# Patient Record
Sex: MALE | Race: WHITE | NOT HISPANIC OR LATINO
[De-identification: names, ages, dates, MRNs, and addresses within clinical notes are randomized per-mention and may not be internally consistent; named-entity substitution may affect disease eponyms.]

---

## 2023-04-27 PROBLEM — Z00.00 ENCOUNTER FOR PREVENTIVE HEALTH EXAMINATION: Status: ACTIVE | Noted: 2023-04-27

## 2023-05-05 ENCOUNTER — TRANSCRIPTION ENCOUNTER (OUTPATIENT)
Age: 58
End: 2023-05-05

## 2023-05-10 ENCOUNTER — NON-APPOINTMENT (OUTPATIENT)
Age: 58
End: 2023-05-10

## 2023-05-16 ENCOUNTER — APPOINTMENT (OUTPATIENT)
Dept: COLORECTAL SURGERY | Facility: CLINIC | Age: 58
End: 2023-05-16
Payer: COMMERCIAL

## 2023-05-16 VITALS
DIASTOLIC BLOOD PRESSURE: 77 MMHG | SYSTOLIC BLOOD PRESSURE: 121 MMHG | BODY MASS INDEX: 23.82 KG/M2 | HEIGHT: 66.5 IN | HEART RATE: 66 BPM | WEIGHT: 150 LBS

## 2023-05-16 PROCEDURE — 99024 POSTOP FOLLOW-UP VISIT: CPT

## 2023-05-16 NOTE — ASSESSMENT
[FreeTextEntry1] : 57 M here for post-op visit. he is doing well following his discharge. I reviewed and discussed the pathology results with him (pT3N0) cancer. We removed all skin staples today. He has a large left groin node.\par Plan:\par Will discuss his case at the colorectal tumor board.\par Further recommendations after that.\par Will keep a watch on the left groin node. If is is getting smaller, we will observe.\par See again after two weeks.\par Normal diet.\par Avoid exercise apart from walking.\par All questions answered.

## 2023-05-16 NOTE — HISTORY OF PRESENT ILLNESS
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  He didn't have it prior to the surgery but does feel like it's going down \par Appetite is good\par Feels like he is not eating as much but is snacking\par Pt tolerating a low fiber diet, had a crab cake with corn in it,\par Denies nausea or vomiting\par Has lost a lot of weight since a year ago. Pt weighed 185 lbs and is now 150 lbs- wants to know how he can recover the weight\par Moving his bowels regularly, stools are soft yellowish brown, they are better now as initially after surgery they were watery\par Denies pain, just has to move carefully, if he stretches he'll feel a discomfort

## 2023-05-16 NOTE — PHYSICAL EXAM
[Abdomen Masses] : No abdominal masses [Abdomen Tenderness] : ~T No ~M abdominal tenderness [No HSM] : no hepatosplenomegaly [Exam Deferred] : exam was deferred [No Rash or Lesion] : No rash or lesion [Alert] : alert [Oriented to Person] : oriented to person [Oriented to Place] : oriented to place [Oriented to Time] : oriented to time [Calm] : calm [de-identified] : Soft; midline incision clean and healing well; we removed all skin staples; left sided large, palpable groin node  [de-identified] : Normal [de-identified] : Normal [de-identified] : Normal [de-identified] : Normal [de-identified] : Normal

## 2023-05-18 ENCOUNTER — NON-APPOINTMENT (OUTPATIENT)
Age: 58
End: 2023-05-18

## 2023-05-22 ENCOUNTER — NON-APPOINTMENT (OUTPATIENT)
Age: 58
End: 2023-05-22

## 2023-05-23 ENCOUNTER — NON-APPOINTMENT (OUTPATIENT)
Age: 58
End: 2023-05-23

## 2023-05-24 ENCOUNTER — RESULT REVIEW (OUTPATIENT)
Age: 58
End: 2023-05-24

## 2023-05-24 ENCOUNTER — APPOINTMENT (OUTPATIENT)
Dept: HEMATOLOGY ONCOLOGY | Facility: CLINIC | Age: 58
End: 2023-05-24
Payer: COMMERCIAL

## 2023-05-24 VITALS
BODY MASS INDEX: 23.98 KG/M2 | RESPIRATION RATE: 18 BRPM | DIASTOLIC BLOOD PRESSURE: 68 MMHG | HEIGHT: 66.5 IN | WEIGHT: 151 LBS | SYSTOLIC BLOOD PRESSURE: 117 MMHG | OXYGEN SATURATION: 98 % | HEART RATE: 71 BPM | TEMPERATURE: 98.6 F

## 2023-05-24 DIAGNOSIS — Z78.9 OTHER SPECIFIED HEALTH STATUS: ICD-10-CM

## 2023-05-24 DIAGNOSIS — Z87.19 PERSONAL HISTORY OF OTHER DISEASES OF THE DIGESTIVE SYSTEM: ICD-10-CM

## 2023-05-24 DIAGNOSIS — L72.9 FOLLICULAR CYST OF THE SKIN AND SUBCUTANEOUS TISSUE, UNSPECIFIED: ICD-10-CM

## 2023-05-24 DIAGNOSIS — Z80.1 FAMILY HISTORY OF MALIGNANT NEOPLASM OF TRACHEA, BRONCHUS AND LUNG: ICD-10-CM

## 2023-05-24 PROCEDURE — 36415 COLL VENOUS BLD VENIPUNCTURE: CPT

## 2023-05-24 PROCEDURE — 99203 OFFICE O/P NEW LOW 30 MIN: CPT | Mod: 25

## 2023-05-24 RX ORDER — TRAMADOL HYDROCHLORIDE 25 MG/1
TABLET, COATED ORAL
Refills: 0 | Status: DISCONTINUED | COMMUNITY
End: 2023-05-24

## 2023-05-24 RX ORDER — IBUPROFEN 800 MG/1
TABLET, FILM COATED ORAL
Refills: 0 | Status: DISCONTINUED | COMMUNITY
End: 2023-05-24

## 2023-05-24 RX ORDER — ACETAMINOPHEN 325 MG/1
TABLET, FILM COATED ORAL
Refills: 0 | Status: DISCONTINUED | COMMUNITY
End: 2023-05-24

## 2023-05-28 NOTE — ASSESSMENT
[FreeTextEntry1] : Mr. 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 \par He presented to GI with history of unexplained weight loss of 35 pounds since last year and diarrhea x nine months. He underwent colonoscopy on 4/25/23 which showed afrond-like/villous and infiltrating obstructing large mass in the rectosigmoid colon with oozing present.\par  CT Chest/abdomen/pelvis done on 4/25/23 showed a mass lesion in the distal sigmoid colon with malignancy with large bowel obstruction.  There were mildly enlarged left lilac chain lymph nodes measuring up to 7 mm which were likely reactive. CT of the chest was negative. for malignancy. \par Preoperative CEA was normal but he was anemic.\par  He was referred for low anterior/sigmoid resection. \par Pathology from 5/2/23 revealed a moderately differentiated adenocarcinoma in the rectosigmoid with mucinous features invading through the muscularis propria into the pericolonic or perirectal tissue. 24 lymph nodes were negative for tumor. No. of tumor buds 5 per hotspot field, no LVI. No PNI The pathologic staging was pT3pN0. There was no loss of nuclear expression of MMR proteins with low probability of MSI-H. \par \par Plan:\par \par U/S of left inguinal adenopathy\par \par For final decision regarding adjuvant therapy after U/S\par

## 2023-05-28 NOTE — HISTORY OF PRESENT ILLNESS
[de-identified] : Mr. 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 \par He presented to GI with history of unexplained weight loss of 35 pounds since last year and diarrhea x nine months. He underwent colonoscopy on 4/25/23 which showed afrond-like/villous and infiltrating obstructing large mass in the rectosigmoid colon with oozing present.\par  CT Chest/abdomen/pelvis done on 4/25/23 showed a mass lesion in the distal sigmoid colon with malignancy with large bowel obstruction.  There were mildly enlarged left lilac chain lymph nodes measuring up to 7 mm which were likely reactive. CT of the chest was negative. for malignancy. Preoperative CEA was normal but he was anemic. Iron studies are not available. . \par  He was referred for low anterior/sigmoid resection. \par Pathology from 5/2/23 revealed a moderately differentiated adenocarcinoma in the rectosigmoid with mucinous features invading through the muscularis propria into the pericolonic or perirectal tissue. 24 lymph nodes were negative for tumor. The pathologic staging was pT3pN0. There was no loss of nuclear expression of MMR proteins with low probability of MSI-H. \par \par  He reports that his appetite is improving.  He states that sometimes the food "doesn't feel like it's going down"' He takes Pantoprazole for a hiatal hernia.  [de-identified] : Given obstruction on clinical presentation discussed adjuvant thherapy -- xeloda for  6months versus 5- FU/leucovorin\par Discussed left inguinal ultrasound

## 2023-05-28 NOTE — REVIEW OF SYSTEMS
[Diarrhea: Grade 0] : Diarrhea: Grade 0 [Negative] : Allergic/Immunologic [FreeTextEntry4] : Feels like he can't swallow his food sometimes-on Pantoprazole [FreeTextEntry7] : Thick Bm with some urgency after he takes Ensure [de-identified] : right neck cyst

## 2023-05-29 LAB
CEA SERPL-MCNC: 1.3 NG/ML
FERRITIN SERPL-MCNC: 23 NG/ML
FOLATE SERPL-MCNC: 11.5 NG/ML
IRON SATN MFR SERPL: 9 %
IRON SERPL-MCNC: 41 UG/DL
TIBC SERPL-MCNC: 436 UG/DL
UIBC SERPL-MCNC: 396 UG/DL
VIT B12 SERPL-MCNC: 779 PG/ML

## 2023-05-30 ENCOUNTER — APPOINTMENT (OUTPATIENT)
Dept: SURGERY | Facility: CLINIC | Age: 58
End: 2023-05-30

## 2023-05-30 ENCOUNTER — APPOINTMENT (OUTPATIENT)
Dept: COLORECTAL SURGERY | Facility: CLINIC | Age: 58
End: 2023-05-30
Payer: COMMERCIAL

## 2023-05-30 VITALS
DIASTOLIC BLOOD PRESSURE: 85 MMHG | TEMPERATURE: 97.8 F | WEIGHT: 151 LBS | HEIGHT: 65 IN | BODY MASS INDEX: 25.16 KG/M2 | HEART RATE: 63 BPM | SYSTOLIC BLOOD PRESSURE: 135 MMHG

## 2023-05-30 VITALS
BODY MASS INDEX: 25.16 KG/M2 | HEIGHT: 65 IN | HEART RATE: 67 BPM | SYSTOLIC BLOOD PRESSURE: 146 MMHG | WEIGHT: 151 LBS | DIASTOLIC BLOOD PRESSURE: 91 MMHG

## 2023-05-30 PROCEDURE — 99024 POSTOP FOLLOW-UP VISIT: CPT

## 2023-05-30 NOTE — ASSESSMENT
[FreeTextEntry1] : 57 M here for follow up. He is going to start adjuvant chemotherapy under Dr Coleman.\par On exam, the left groin swelling (lymph node) is smaller.\par Plan:\par See again after chemotherapy.\par To see Dr Reyez to rule out groin hernia.\par Adjuvant chemotherapy per Dr Coleman.\par All  questions answered.

## 2023-05-30 NOTE — HISTORY OF PRESENT ILLNESS
[FreeTextEntry1] : 57 year old male pt s/p on 5/2/23 open low anterior resection and sigmoid resection, intraoperative flexible sigmoidoscopy for obstructing recto-sigmoid tumor s/p colonoscopic stent placement\par \par Pathology:\par pT3N0\par \par Last seen May 16\par \par Saw Dr. Coleman on 5/24/23; advised adjuvant chemotherapy.\par \par Doing well; has some constipation; tolerating a normal diet.\par Reports the left groin swelling is smaller.

## 2023-05-30 NOTE — PHYSICAL EXAM
[Exam Deferred] : exam was deferred [No Rash or Lesion] : No rash or lesion [Alert] : alert [Oriented to Person] : oriented to person [Oriented to Place] : oriented to place [Oriented to Time] : oriented to time [Calm] : calm [de-identified] : Soft; well healed midline incision; left groin swelling (lymph node) is smaller and non tender [de-identified] : Normal [de-identified] : Normal [de-identified] : Normal [de-identified] : Normal [de-identified] : Normal

## 2023-06-01 ENCOUNTER — APPOINTMENT (OUTPATIENT)
Dept: HEMATOLOGY ONCOLOGY | Facility: CLINIC | Age: 58
End: 2023-06-01
Payer: COMMERCIAL

## 2023-06-01 LAB
DPYD GENOTYPE: NORMAL
DPYD PHENOTYPE: NORMAL
DPYD SPECIMEN: NORMAL

## 2023-06-01 PROCEDURE — 99443: CPT

## 2023-06-11 NOTE — ASSESSMENT
[FreeTextEntry1] : Mr. 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 \par He presented to GI with history of unexplained weight loss of 35 pounds since last year and diarrhea x nine months. He underwent colonoscopy on 4/25/23 which showed afrond-like/villous and infiltrating obstructing large mass in the rectosigmoid colon with oozing present.\par  CT Chest/abdomen/pelvis done on 4/25/23 showed a mass lesion in the distal sigmoid colon with malignancy with large bowel obstruction.  There were mildly enlarged left lilac chain lymph nodes measuring up to 7 mm which were likely reactive. CT of the chest was negative. for malignancy. \par Preoperative CEA was normal but he was anemic.\par  He was referred for low anterior/sigmoid resection. \par Pathology from 5/2/23 revealed a moderately differentiated adenocarcinoma in the rectosigmoid with mucinous features invading through the muscularis propria into the pericolonic or perirectal tissue. 24 lymph nodes were negative for tumor. No. of tumor buds 5 per hotspot field, no LVI. No PNI The pathologic staging was pT3pN0. There was no loss of nuclear expression of MMR proteins with low probability of MSI-H. \par \par Plan:\par \par Patient to consider his schedule and make a decision regarding port placement and  infusional 5- FU /Leucovorin x 6 months\par \par

## 2023-06-11 NOTE — HISTORY OF PRESENT ILLNESS
[de-identified] : Mr. 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 \par He presented to GI with history of unexplained weight loss of 35 pounds since last year and diarrhea x nine months. He underwent colonoscopy on 4/25/23 which showed afrond-like/villous and infiltrating obstructing large mass in the rectosigmoid colon with oozing present.\par  CT Chest/abdomen/pelvis done on 4/25/23 showed a mass lesion in the distal sigmoid colon with malignancy with large bowel obstruction.  There were mildly enlarged left lilac chain lymph nodes measuring up to 7 mm which were likely reactive. CT of the chest was negative. for malignancy. Preoperative CEA was normal but he was anemic. Iron studies are not available. . \par  He was referred for low anterior/sigmoid resection. \par Pathology from 5/2/23 revealed a moderately differentiated adenocarcinoma in the rectosigmoid with mucinous features invading through the muscularis propria into the pericolonic or perirectal tissue. 24 lymph nodes were negative for tumor. The pathologic staging was pT3pN0. There was no loss of nuclear expression of MMR proteins with low probability of MSI-H. \par \par  He reports that his appetite is improving.  He states that sometimes the food "doesn't feel like it's going down"' He takes Pantoprazole for a hiatal hernia.  [de-identified] : Verbal consent obtained for telephone visit\par \par Given obstruction on clinical presentation discussed adjuvant thherapy -- xeloda for  6months versus 5- FU/leucovorin\par Discussed left inguinal ultrasound and scrotal U/S -- benign adenopathy\par \par Discussed schedule, complications, supportive care with infusional 5- FU/Leucovorin, Bolus 5- FU/leucovorin and xEloda\par Recommneded infusional 5-FU/Leucovorin given side effect profile

## 2023-06-11 NOTE — REVIEW OF SYSTEMS
[Diarrhea: Grade 0] : Diarrhea: Grade 0 [Negative] : Allergic/Immunologic [FreeTextEntry4] : Feels like he can't swallow his food sometimes-on Pantoprazole [FreeTextEntry7] : Thick Bm with some urgency after he takes Ensure [de-identified] : right neck cyst

## 2023-06-12 ENCOUNTER — APPOINTMENT (OUTPATIENT)
Dept: HEMATOLOGY ONCOLOGY | Facility: CLINIC | Age: 58
End: 2023-06-12
Payer: COMMERCIAL

## 2023-06-12 VITALS
BODY MASS INDEX: 25.99 KG/M2 | WEIGHT: 156 LBS | HEART RATE: 63 BPM | RESPIRATION RATE: 18 BRPM | TEMPERATURE: 98.5 F | OXYGEN SATURATION: 97 % | DIASTOLIC BLOOD PRESSURE: 78 MMHG | SYSTOLIC BLOOD PRESSURE: 129 MMHG | HEIGHT: 65 IN

## 2023-06-12 PROCEDURE — 36415 COLL VENOUS BLD VENIPUNCTURE: CPT

## 2023-06-12 PROCEDURE — 99213 OFFICE O/P EST LOW 20 MIN: CPT | Mod: 25

## 2023-06-13 NOTE — ASSESSMENT
[FreeTextEntry1] : Mr. 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 \par He presented to GI with history of unexplained weight loss of 35 pounds since last year and diarrhea x nine months. He underwent colonoscopy on 4/25/23 which showed afrond-like/villous and infiltrating obstructing large mass in the rectosigmoid colon with oozing present.\par  CT Chest/abdomen/pelvis done on 4/25/23 showed a mass lesion in the distal sigmoid colon with malignancy with large bowel obstruction.  There were mildly enlarged left lilac chain lymph nodes measuring up to 7 mm which were likely reactive. CT of the chest was negative. for malignancy. \par Preoperative CEA was normal but he was anemic.\par  He was referred for low anterior/sigmoid resection. \par Pathology from 5/2/23 revealed a moderately differentiated adenocarcinoma in the rectosigmoid with mucinous features invading through the muscularis propria into the pericolonic or perirectal tissue. 24 lymph nodes were negative for tumor. No. of tumor buds 5 per hotspot field, no LVI. No PNI The pathologic staging was pT3pN0. There was no loss of nuclear expression of MMR proteins with low probability of MSI-H. \par \par \par Plan:\par to schedule port-a-cath\par To schedule FOLFIRI\par TO check CBC day before treatment\par \par \par \par \par \par

## 2023-06-13 NOTE — HISTORY OF PRESENT ILLNESS
[de-identified] : Mr. 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 \par He presented to GI with history of unexplained weight loss of 35 pounds since last year and diarrhea x nine months. He underwent colonoscopy on 4/25/23 which showed afrond-like/villous and infiltrating obstructing large mass in the rectosigmoid colon with oozing present.\par  CT Chest/abdomen/pelvis done on 4/25/23 showed a mass lesion in the distal sigmoid colon with malignancy with large bowel obstruction.  There were mildly enlarged left lilac chain lymph nodes measuring up to 7 mm which were likely reactive. CT of the chest was negative. for malignancy. Preoperative CEA was normal but he was anemic. Iron studies are not available. . \par  He was referred for low anterior/sigmoid resection. \par Pathology from 5/2/23 revealed a moderately differentiated adenocarcinoma in the rectosigmoid with mucinous features invading through the muscularis propria into the pericolonic or perirectal tissue. 24 lymph nodes were negative for tumor. The pathologic staging was pT3pN0. There was no loss of nuclear expression of MMR proteins with low probability of MSI-H. \par \par  He reports that his appetite is improving.  He states that sometimes the food "doesn't feel like it's going down"' He takes Pantoprazole for a hiatal hernia.  [de-identified] : Verbal consent obtained for telephone visit\par \par Given obstruction on clinical presentation discussed adjuvant thherapy -- xeloda for  6months versus 5- FU/leucovorin\par Discussed left inguinal ultrasound and scrotal U/S -- benign adenopathy\par \par Discussed schedule, complications, supportive care with infusional 5- FU/Leucovorin, Bolus 5- FU/leucovorin and xEloda\par Recommneded infusional 5-FU/Leucovorin given side effect profile

## 2023-06-13 NOTE — REVIEW OF SYSTEMS
[Diarrhea: Grade 0] : Diarrhea: Grade 0 [Negative] : Allergic/Immunologic [FreeTextEntry4] : Feels like he can't swallow his food sometimes-on Pantoprazole [FreeTextEntry7] : Thick Bm with some urgency after he takes Ensure [de-identified] : right neck cyst

## 2023-06-14 ENCOUNTER — RESULT REVIEW (OUTPATIENT)
Age: 58
End: 2023-06-14

## 2023-06-15 ENCOUNTER — NON-APPOINTMENT (OUTPATIENT)
Age: 58
End: 2023-06-15

## 2023-06-15 ENCOUNTER — RESULT REVIEW (OUTPATIENT)
Age: 58
End: 2023-06-15

## 2023-06-16 ENCOUNTER — RESULT REVIEW (OUTPATIENT)
Age: 58
End: 2023-06-16

## 2023-07-01 ENCOUNTER — LABORATORY RESULT (OUTPATIENT)
Age: 58
End: 2023-07-01

## 2023-07-03 ENCOUNTER — APPOINTMENT (OUTPATIENT)
Dept: HEMATOLOGY ONCOLOGY | Facility: CLINIC | Age: 58
End: 2023-07-03
Payer: COMMERCIAL

## 2023-07-03 VITALS
SYSTOLIC BLOOD PRESSURE: 122 MMHG | WEIGHT: 153 LBS | BODY MASS INDEX: 25.49 KG/M2 | HEART RATE: 71 BPM | TEMPERATURE: 97.6 F | HEIGHT: 65 IN | OXYGEN SATURATION: 98 % | DIASTOLIC BLOOD PRESSURE: 83 MMHG | RESPIRATION RATE: 18 BRPM

## 2023-07-03 VITALS — HEIGHT: 65.25 IN | BODY MASS INDEX: 25.19 KG/M2 | WEIGHT: 153 LBS

## 2023-07-03 LAB — APTT BLD: 42.9 SEC

## 2023-07-03 PROCEDURE — 36415 COLL VENOUS BLD VENIPUNCTURE: CPT

## 2023-07-03 PROCEDURE — 99213 OFFICE O/P EST LOW 20 MIN: CPT | Mod: 25

## 2023-07-03 NOTE — REVIEW OF SYSTEMS
[Diarrhea: Grade 0] : Diarrhea: Grade 0 [Negative] : Allergic/Immunologic [FreeTextEntry4] : Feels like he can't swallow his food sometimes-on Pantoprazole [FreeTextEntry7] : Thick Bm with some urgency after he takes Ensure [de-identified] : right neck cyst

## 2023-07-03 NOTE — PHYSICAL EXAM
[Fully active, able to carry on all pre-disease performance without restriction] : Status 0 - Fully active, able to carry on all pre-disease performance without restriction [Normal] : affect appropriate [de-identified] : sebaceous cyst in the nape of the neck

## 2023-07-03 NOTE — HISTORY OF PRESENT ILLNESS
[de-identified] : Mr. 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 \par He presented to GI with history of unexplained weight loss of 35 pounds since last year and diarrhea x nine months. He underwent colonoscopy on 4/25/23 which showed afrond-like/villous and infiltrating obstructing large mass in the rectosigmoid colon with oozing present.\par  CT Chest/abdomen/pelvis done on 4/25/23 showed a mass lesion in the distal sigmoid colon with malignancy with large bowel obstruction.  There were mildly enlarged left lilac chain lymph nodes measuring up to 7 mm which were likely reactive. CT of the chest was negative. for malignancy. Preoperative CEA was normal but he was anemic. Iron studies are not available. . \par  He was referred for low anterior/sigmoid resection. \par Pathology from 5/2/23 revealed a moderately differentiated adenocarcinoma in the rectosigmoid with mucinous features invading through the muscularis propria into the pericolonic or perirectal tissue. 24 lymph nodes were negative for tumor. The pathologic staging was pT3pN0. There was no loss of nuclear expression of MMR proteins with low probability of MSI-H. \par \par  He reports that his appetite is improving.  He states that sometimes the food "doesn't feel like it's going down"' He takes Pantoprazole for a hiatal hernia.  [de-identified] : \par \par circulating tumor DNA not detected 6/14/23\par \par Discussed that per [facility]CC[facility] guidelines , ct DNA results not sctionable at this time\par \par rediscussed side effrfects , schedule, supportive care

## 2023-07-03 NOTE — ASSESSMENT
[FreeTextEntry1] : Mr. 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 \par He presented to GI with history of unexplained weight loss of 35 pounds since last year and diarrhea x nine months. He underwent colonoscopy on 4/25/23 which showed afrond-like/villous and infiltrating obstructing large mass in the rectosigmoid colon with oozing present.\par  CT Chest/abdomen/pelvis done on 4/25/23 showed a mass lesion in the distal sigmoid colon with malignancy with large bowel obstruction.  There were mildly enlarged left lilac chain lymph nodes measuring up to 7 mm which were likely reactive. CT of the chest was negative. for malignancy. \par Preoperative CEA was normal but he was anemic.\par  He was referred for low anterior/sigmoid resection. \par Pathology from 5/2/23 revealed a moderately differentiated adenocarcinoma in the rectosigmoid with mucinous features invading through the muscularis propria into the pericolonic or perirectal tissue. 24 lymph nodes were negative for tumor. No. of tumor buds 5 per hotspot field, no LVI. No PNI The pathologic staging was pT3pN0. There was no loss of nuclear expression of MMR proteins with low probability of MSI-H. \par \par C1 infusional 5- FU 6/19/23\par \par \par Plan:\par \par For C2  Infusional 5- FU\par \par senna/colace prn\par \par RTC 2 weeks . check CBC/CMP/MG  on 7/15\par \par \par \par \par \par \par \par

## 2023-07-17 ENCOUNTER — APPOINTMENT (OUTPATIENT)
Dept: HEMATOLOGY ONCOLOGY | Facility: CLINIC | Age: 58
End: 2023-07-17
Payer: COMMERCIAL

## 2023-07-17 ENCOUNTER — RESULT REVIEW (OUTPATIENT)
Age: 58
End: 2023-07-17

## 2023-07-17 VITALS
DIASTOLIC BLOOD PRESSURE: 81 MMHG | RESPIRATION RATE: 18 BRPM | HEART RATE: 66 BPM | TEMPERATURE: 97.9 F | OXYGEN SATURATION: 98 % | HEIGHT: 65.25 IN | SYSTOLIC BLOOD PRESSURE: 134 MMHG | BODY MASS INDEX: 25.52 KG/M2 | WEIGHT: 155 LBS

## 2023-07-17 PROCEDURE — 99213 OFFICE O/P EST LOW 20 MIN: CPT

## 2023-07-23 NOTE — HISTORY OF PRESENT ILLNESS
[de-identified] : Mr. 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 \par He presented to GI with history of unexplained weight loss of 35 pounds since last year and diarrhea x nine months. He underwent colonoscopy on 4/25/23 which showed afrond-like/villous and infiltrating obstructing large mass in the rectosigmoid colon with oozing present.\par  CT Chest/abdomen/pelvis done on 4/25/23 showed a mass lesion in the distal sigmoid colon with malignancy with large bowel obstruction.  There were mildly enlarged left lilac chain lymph nodes measuring up to 7 mm which were likely reactive. CT of the chest was negative. for malignancy. Preoperative CEA was normal but he was anemic. Iron studies are not available. . \par  He was referred for low anterior/sigmoid resection. \par Pathology from 5/2/23 revealed a moderately differentiated adenocarcinoma in the rectosigmoid with mucinous features invading through the muscularis propria into the pericolonic or perirectal tissue. 24 lymph nodes were negative for tumor. The pathologic staging was pT3pN0. There was no loss of nuclear expression of MMR proteins with low probability of MSI-H. \par \par  He reports that his appetite is improving.  He states that sometimes the food "doesn't feel like it's going down"' He takes Pantoprazole for a hiatal hernia.  [de-identified] : Pt presents with rectosigmoid carcinoma receiving infusional 5FU. He is tolerating it well except her reports some discoloration (orange streaking) on his palms slight constipation last Friday through Monday.\par circulating tumor DNA not detected 6/14/23\par \par Discussed that per NCCN guidelines , ct DNA results not actionable at this time\par \par rediscussed side effrfects , schedule, supportive care

## 2023-07-23 NOTE — ASSESSMENT
[FreeTextEntry1] : Mr. 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 \par He presented to GI with history of unexplained weight loss of 35 pounds since last year and diarrhea x nine months. He underwent colonoscopy on 4/25/23 which showed afrond-like/villous and infiltrating obstructing large mass in the rectosigmoid colon with oozing present.\par  CT Chest/abdomen/pelvis done on 4/25/23 showed a mass lesion in the distal sigmoid colon with malignancy with large bowel obstruction.  There were mildly enlarged left lilac chain lymph nodes measuring up to 7 mm which were likely reactive. CT of the chest was negative. for malignancy. \par Preoperative CEA was normal but he was anemic.\par  He was referred for low anterior/sigmoid resection. \par Pathology from 5/2/23 revealed a moderately differentiated adenocarcinoma in the rectosigmoid with mucinous features invading through the muscularis propria into the pericolonic or perirectal tissue. 24 lymph nodes were negative for tumor. No. of tumor buds 5 per hotspot field, no LVI. No PNI The pathologic staging was pT3pN0. There was no loss of nuclear expression of MMR proteins with low probability of MSI-H. \par \par C1 infusional 5- FU 6/19/23\par \par \par Plan:\par - For C3  Infusional 5- FU- tolerating it well except for some orange streaking of palms. Discussed possible hand foot syndrome from 5 FU and using a good moisturizer\par - Constipation for a few days-discussed bowel regimen with Senna,Colace prn\par - Iron profile borderline- pt prefers to increase iron rich foods and take oral iron, discussed possibility of IV iron\par - Labs ordered, drawn in the office, reviewed and are adequate for treatment\par - Continue routine, age-appropriate, healthcare maintenance \par - Office visit in 2 weeks or prn for new or worsening symptoms. \par \par \par \par \par \par \par \par

## 2023-07-23 NOTE — PHYSICAL EXAM
MD at bedside and aware of BP, verbal order received for 500ml Saline bolus.    [Fully active, able to carry on all pre-disease performance without restriction] : Status 0 - Fully active, able to carry on all pre-disease performance without restriction [Normal] : affect appropriate [de-identified] : sebaceous cyst in the nape of the neck

## 2023-07-31 ENCOUNTER — RESULT REVIEW (OUTPATIENT)
Age: 58
End: 2023-07-31

## 2023-07-31 ENCOUNTER — APPOINTMENT (OUTPATIENT)
Dept: HEMATOLOGY ONCOLOGY | Facility: CLINIC | Age: 58
End: 2023-07-31
Payer: COMMERCIAL

## 2023-07-31 VITALS
SYSTOLIC BLOOD PRESSURE: 150 MMHG | WEIGHT: 154 LBS | OXYGEN SATURATION: 98 % | HEIGHT: 65.25 IN | BODY MASS INDEX: 25.35 KG/M2 | TEMPERATURE: 98.7 F | RESPIRATION RATE: 18 BRPM | HEART RATE: 63 BPM | DIASTOLIC BLOOD PRESSURE: 82 MMHG

## 2023-07-31 PROCEDURE — 36415 COLL VENOUS BLD VENIPUNCTURE: CPT

## 2023-07-31 PROCEDURE — 99213 OFFICE O/P EST LOW 20 MIN: CPT | Mod: 25

## 2023-08-06 NOTE — PHYSICAL EXAM
[Fully active, able to carry on all pre-disease performance without restriction] : Status 0 - Fully active, able to carry on all pre-disease performance without restriction [Normal] : affect appropriate [de-identified] : sebaceous cyst in the nape of the neck

## 2023-08-06 NOTE — ASSESSMENT
[FreeTextEntry1] : Mr. 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  He presented to GI with history of unexplained weight loss of 35 pounds since last year and diarrhea x nine months. He underwent colonoscopy on 4/25/23 which showed afrond-like/villous and infiltrating obstructing large mass in the rectosigmoid colon with oozing present.  CT Chest/abdomen/pelvis done on 4/25/23 showed a mass lesion in the distal sigmoid colon with malignancy with large bowel obstruction.  There were mildly enlarged left lilac chain lymph nodes measuring up to 7 mm which were likely reactive. CT of the chest was negative. for malignancy.  Preoperative CEA was normal but he was anemic.  He was referred for low anterior/sigmoid resection.  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 24 lymph nodes were negative for tumor. No. of tumor buds 5 per hotspot field, no LVI. No PNI The pathologic staging was pT3pN0. There was no loss of nuclear expression of MMR proteins with low probability of MSI-H.   C1 infusional 5- FU 6/19/23   Plan:  For C4  Infusional 5- FU  check cea / scans midtreatment  RTC 2 weeks . check CBC/CMP/MG  on 7/15

## 2023-08-06 NOTE — HISTORY OF PRESENT ILLNESS
[de-identified] : Mr. 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 \par  He presented to GI with history of unexplained weight loss of 35 pounds since last year and diarrhea x nine months. He underwent colonoscopy on 4/25/23 which showed afrond-like/villous and infiltrating obstructing large mass in the rectosigmoid colon with oozing present.\par   CT Chest/abdomen/pelvis done on 4/25/23 showed a mass lesion in the distal sigmoid colon with malignancy with large bowel obstruction.  There were mildly enlarged left lilac chain lymph nodes measuring up to 7 mm which were likely reactive. CT of the chest was negative. for malignancy. Preoperative CEA was normal but he was anemic. Iron studies are not available. . \par   He was referred for low anterior/sigmoid resection. 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 24 lymph nodes were negative for tumor. The pathologic staging was pT3pN0. There was no loss of nuclear expression of MMR proteins with low probability of MSI-H. \par  \par   He reports that his appetite is improving.  He states that sometimes the food "doesn't feel like it's going down"' He takes Pantoprazole for a hiatal hernia.  [de-identified] :  circulating tumor DNA not detected 6/14/23  Discussed that per [facility]CCN guidelines , ct DNA results not actionable at this time  rediscussed side effects , schedule, supportive care

## 2023-08-06 NOTE — REVIEW OF SYSTEMS
[Diarrhea: Grade 0] : Diarrhea: Grade 0 [Negative] : Allergic/Immunologic [FreeTextEntry4] : Feels like he can't swallow his food sometimes-on Pantoprazole [FreeTextEntry7] : Thick Bm with some urgency after he takes Ensure [de-identified] : right neck cyst

## 2023-08-10 DIAGNOSIS — R59.0 LOCALIZED ENLARGED LYMPH NODES: ICD-10-CM

## 2023-08-14 ENCOUNTER — RESULT REVIEW (OUTPATIENT)
Age: 58
End: 2023-08-14

## 2023-08-14 ENCOUNTER — APPOINTMENT (OUTPATIENT)
Dept: HEMATOLOGY ONCOLOGY | Facility: CLINIC | Age: 58
End: 2023-08-14
Payer: COMMERCIAL

## 2023-08-14 VITALS
HEIGHT: 65.25 IN | BODY MASS INDEX: 24.69 KG/M2 | TEMPERATURE: 98.3 F | SYSTOLIC BLOOD PRESSURE: 120 MMHG | HEART RATE: 64 BPM | WEIGHT: 150 LBS | DIASTOLIC BLOOD PRESSURE: 76 MMHG | OXYGEN SATURATION: 98 % | RESPIRATION RATE: 18 BRPM

## 2023-08-14 PROCEDURE — 99213 OFFICE O/P EST LOW 20 MIN: CPT

## 2023-08-14 NOTE — ASSESSMENT
[FreeTextEntry1] : Mr. 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  He presented to GI with history of unexplained weight loss of 35 pounds since last year and diarrhea x nine months. He underwent colonoscopy on 4/25/23 which showed afrond-like/villous and infiltrating obstructing large mass in the rectosigmoid colon with oozing present.  CT Chest/abdomen/pelvis done on 4/25/23 showed a mass lesion in the distal sigmoid colon with malignancy with large bowel obstruction.  There were mildly enlarged left lilac chain lymph nodes measuring up to 7 mm which were likely reactive. CT of the chest was negative. for malignancy.  Preoperative CEA was normal but he was anemic.  He was referred for low anterior/sigmoid resection.  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 24 lymph nodes were negative for tumor. No. of tumor buds 5 per hotspot field, no LVI. No PNI The pathologic staging was pT3pN0. There was no loss of nuclear expression of MMR proteins with low probability of MSI-H.   C1 infusional 5- FU 6/19/23   Plan: for cycle 5/12 For C4  Infusional 5- FU check cea / scans midtreatment Patient complaining of slight headache but states that he feels that his it is so minor he does not need to take any analgesics or have it evaluated. Sebaceous cyst-patient will be referred to Dr. Cha for evaluation. Labs ordered, drawn in the office, reviewed and are adequate for treatment Continue routine, age-appropriate, healthcare maintenance  History of present illness, review of systems, physical exam and treatment plan reviewed with Dr. Virginia Neff Office visit in 2 weeks or prn for new or worsening symptoms.

## 2023-08-14 NOTE — PHYSICAL EXAM
[Fully active, able to carry on all pre-disease performance without restriction] : Status 0 - Fully active, able to carry on all pre-disease performance without restriction [Normal] : affect appropriate [de-identified] : sebaceous cyst in the nape of the neck

## 2023-08-14 NOTE — REVIEW OF SYSTEMS
[Diarrhea: Grade 0] : Diarrhea: Grade 0 [Negative] : Gastrointestinal [de-identified] : right neck sebacous  cyst [de-identified] : Slight headache

## 2023-08-14 NOTE — HISTORY OF PRESENT ILLNESS
[de-identified] : Mr. 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 \par  He presented to GI with history of unexplained weight loss of 35 pounds since last year and diarrhea x nine months. He underwent colonoscopy on 4/25/23 which showed afrond-like/villous and infiltrating obstructing large mass in the rectosigmoid colon with oozing present.\par   CT Chest/abdomen/pelvis done on 4/25/23 showed a mass lesion in the distal sigmoid colon with malignancy with large bowel obstruction.  There were mildly enlarged left lilac chain lymph nodes measuring up to 7 mm which were likely reactive. CT of the chest was negative. for malignancy. Preoperative CEA was normal but he was anemic. Iron studies are not available. . \par   He was referred for low anterior/sigmoid resection. 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 24 lymph nodes were negative for tumor. The pathologic staging was pT3pN0. There was no loss of nuclear expression of MMR proteins with low probability of MSI-H. \par  \par   He reports that his appetite is improving.  He states that sometimes the food "doesn't feel like it's going down"' He takes Pantoprazole for a hiatal hernia.  [de-identified] : Pt presents for high risk Stage II (presented with bowel obstruction) rectosigmoid cancer receiving 5FU leuk cycle 5 He has slight headache after treatment. He would like to have surgery on a sebacioues cysts behind his neck at some point during school vacation.   circulating tumor DNA not detected 6/14/23  Discussed that per NCCN guidelines , ct DNA results not actionable at this time  rediscussed side effects , schedule, supportive care

## 2023-08-28 ENCOUNTER — RESULT REVIEW (OUTPATIENT)
Age: 58
End: 2023-08-28

## 2023-08-28 ENCOUNTER — APPOINTMENT (OUTPATIENT)
Dept: HEMATOLOGY ONCOLOGY | Facility: CLINIC | Age: 58
End: 2023-08-28
Payer: COMMERCIAL

## 2023-08-28 VITALS
RESPIRATION RATE: 18 BRPM | HEIGHT: 65.25 IN | OXYGEN SATURATION: 98 % | HEART RATE: 69 BPM | TEMPERATURE: 97.9 F | DIASTOLIC BLOOD PRESSURE: 71 MMHG | WEIGHT: 151 LBS | BODY MASS INDEX: 24.85 KG/M2 | SYSTOLIC BLOOD PRESSURE: 123 MMHG

## 2023-08-28 PROCEDURE — 99213 OFFICE O/P EST LOW 20 MIN: CPT

## 2023-08-28 NOTE — ASSESSMENT
[FreeTextEntry1] : Mr. 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  He presented to GI with history of unexplained weight loss of 35 pounds since last year and diarrhea x nine months. He underwent colonoscopy on 4/25/23 which showed afrond-like/villous and infiltrating obstructing large mass in the rectosigmoid colon with oozing present.  CT Chest/abdomen/pelvis done on 4/25/23 showed a mass lesion in the distal sigmoid colon with malignancy with large bowel obstruction.  There were mildly enlarged left lilac chain lymph nodes measuring up to 7 mm which were likely reactive. CT of the chest was negative. for malignancy.  Preoperative CEA was normal but he was anemic.  He was referred for low anterior/sigmoid resection.  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 24 lymph nodes were negative for tumor. No. of tumor buds 5 per hotspot field, no LVI. No PNI The pathologic staging was pT3pN0. There was no loss of nuclear expression of MMR proteins with low probability of MSI-H.   C1 infusional 5- FU 6/19/23   Plan: - for cycle 6/12 5FU/Leuk   - check cea / scans midtreatment - Labs ordered, drawn in the office, reviewed and are adequate for treatment. Iron levels pending. Continue routine, age-appropriate, healthcare maintenance  History of present illness, review of systems, physical exam and treatment plan reviewed with Dr. Virginia Neff Office visit in 2 weeks or prn for new or worsening symptoms.

## 2023-08-28 NOTE — PHYSICAL EXAM
[Fully active, able to carry on all pre-disease performance without restriction] : Status 0 - Fully active, able to carry on all pre-disease performance without restriction [Normal] : affect appropriate [de-identified] : sebaceous cyst in the nape of the neck

## 2023-08-28 NOTE — HISTORY OF PRESENT ILLNESS
[de-identified] : Mr. 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 \par  He presented to GI with history of unexplained weight loss of 35 pounds since last year and diarrhea x nine months. He underwent colonoscopy on 4/25/23 which showed afrond-like/villous and infiltrating obstructing large mass in the rectosigmoid colon with oozing present.\par   CT Chest/abdomen/pelvis done on 4/25/23 showed a mass lesion in the distal sigmoid colon with malignancy with large bowel obstruction.  There were mildly enlarged left lilac chain lymph nodes measuring up to 7 mm which were likely reactive. CT of the chest was negative. for malignancy. Preoperative CEA was normal but he was anemic. Iron studies are not available. . \par   He was referred for low anterior/sigmoid resection. 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 24 lymph nodes were negative for tumor. The pathologic staging was pT3pN0. There was no loss of nuclear expression of MMR proteins with low probability of MSI-H. \par  \par   He reports that his appetite is improving.  He states that sometimes the food "doesn't feel like it's going down"' He takes Pantoprazole for a hiatal hernia.  [de-identified] : Pt presents for high risk Stage II (presented with bowel obstruction) rectosigmoid cancer receiving 5FU leuk eow cycle 6 He is tolerating treatment well without any side effects.    His irons were previously low, he is atking oral iron for one month.   We will recheck.    circulating tumor DNA not detected 6/14/23  Discussed that per NCCN guidelines , ct DNA results not actionable at this time  rediscussed side effects , schedule, supportive care

## 2023-08-28 NOTE — REVIEW OF SYSTEMS
[Diarrhea: Grade 0] : Diarrhea: Grade 0 [Negative] : Allergic/Immunologic [de-identified] : right neck sebacous  cyst [de-identified] : Slight headache

## 2023-08-29 ENCOUNTER — APPOINTMENT (OUTPATIENT)
Dept: HEMATOLOGY ONCOLOGY | Facility: CLINIC | Age: 58
End: 2023-08-29

## 2023-08-29 ENCOUNTER — NON-APPOINTMENT (OUTPATIENT)
Age: 58
End: 2023-08-29

## 2023-09-02 LAB
FERRITIN SERPL-MCNC: 44 NG/ML
IRON SATN MFR SERPL: 16 %
IRON SERPL-MCNC: 68 UG/DL
TIBC SERPL-MCNC: 439 UG/DL
UIBC SERPL-MCNC: 370 UG/DL

## 2023-09-08 ENCOUNTER — RESULT REVIEW (OUTPATIENT)
Age: 58
End: 2023-09-08

## 2023-09-11 ENCOUNTER — APPOINTMENT (OUTPATIENT)
Dept: HEMATOLOGY ONCOLOGY | Facility: CLINIC | Age: 58
End: 2023-09-11

## 2023-09-14 ENCOUNTER — TRANSCRIPTION ENCOUNTER (OUTPATIENT)
Age: 58
End: 2023-09-14

## 2023-09-21 ENCOUNTER — APPOINTMENT (OUTPATIENT)
Dept: SURGERY | Facility: CLINIC | Age: 58
End: 2023-09-21
Payer: COMMERCIAL

## 2023-09-21 PROCEDURE — 99024 POSTOP FOLLOW-UP VISIT: CPT

## 2023-09-27 ENCOUNTER — NON-APPOINTMENT (OUTPATIENT)
Age: 58
End: 2023-09-27

## 2023-10-12 ENCOUNTER — APPOINTMENT (OUTPATIENT)
Dept: SURGERY | Facility: CLINIC | Age: 58
End: 2023-10-12
Payer: COMMERCIAL

## 2023-10-12 VITALS — SYSTOLIC BLOOD PRESSURE: 126 MMHG | HEART RATE: 72 BPM | DIASTOLIC BLOOD PRESSURE: 80 MMHG | TEMPERATURE: 98.3 F

## 2023-10-12 PROCEDURE — 99024 POSTOP FOLLOW-UP VISIT: CPT

## 2023-10-16 ENCOUNTER — APPOINTMENT (OUTPATIENT)
Dept: HEMATOLOGY ONCOLOGY | Facility: CLINIC | Age: 58
End: 2023-10-16

## 2023-10-23 ENCOUNTER — APPOINTMENT (OUTPATIENT)
Dept: HEMATOLOGY ONCOLOGY | Facility: CLINIC | Age: 58
End: 2023-10-23
Payer: COMMERCIAL

## 2023-10-23 ENCOUNTER — RESULT REVIEW (OUTPATIENT)
Age: 58
End: 2023-10-23

## 2023-10-23 VITALS
DIASTOLIC BLOOD PRESSURE: 87 MMHG | HEART RATE: 63 BPM | WEIGHT: 148 LBS | HEIGHT: 65.25 IN | SYSTOLIC BLOOD PRESSURE: 144 MMHG | TEMPERATURE: 98.6 F | OXYGEN SATURATION: 99 % | BODY MASS INDEX: 24.36 KG/M2 | RESPIRATION RATE: 18 BRPM

## 2023-10-23 PROCEDURE — 99213 OFFICE O/P EST LOW 20 MIN: CPT

## 2023-11-06 ENCOUNTER — RESULT REVIEW (OUTPATIENT)
Age: 58
End: 2023-11-06

## 2023-11-06 ENCOUNTER — APPOINTMENT (OUTPATIENT)
Dept: HEMATOLOGY ONCOLOGY | Facility: CLINIC | Age: 58
End: 2023-11-06
Payer: COMMERCIAL

## 2023-11-06 VITALS
DIASTOLIC BLOOD PRESSURE: 72 MMHG | BODY MASS INDEX: 24.03 KG/M2 | OXYGEN SATURATION: 98 % | SYSTOLIC BLOOD PRESSURE: 118 MMHG | WEIGHT: 146 LBS | RESPIRATION RATE: 18 BRPM | HEIGHT: 65.25 IN | HEART RATE: 72 BPM | TEMPERATURE: 97.8 F

## 2023-11-06 PROCEDURE — 36415 COLL VENOUS BLD VENIPUNCTURE: CPT

## 2023-11-06 PROCEDURE — 99213 OFFICE O/P EST LOW 20 MIN: CPT | Mod: 25

## 2023-11-12 LAB
CEA SERPL-MCNC: 1.2 NG/ML
FERRITIN SERPL-MCNC: 18 NG/ML
IRON SATN MFR SERPL: 9 %
IRON SERPL-MCNC: 39 UG/DL
TIBC SERPL-MCNC: 434 UG/DL
UIBC SERPL-MCNC: 394 UG/DL

## 2023-11-20 ENCOUNTER — APPOINTMENT (OUTPATIENT)
Dept: HEMATOLOGY ONCOLOGY | Facility: CLINIC | Age: 58
End: 2023-11-20
Payer: COMMERCIAL

## 2023-11-20 ENCOUNTER — RESULT REVIEW (OUTPATIENT)
Age: 58
End: 2023-11-20

## 2023-11-20 VITALS
HEIGHT: 65.25 IN | DIASTOLIC BLOOD PRESSURE: 75 MMHG | TEMPERATURE: 98 F | WEIGHT: 152 LBS | SYSTOLIC BLOOD PRESSURE: 114 MMHG | RESPIRATION RATE: 16 BRPM | OXYGEN SATURATION: 99 % | BODY MASS INDEX: 25.02 KG/M2 | HEART RATE: 64 BPM

## 2023-11-20 PROCEDURE — 36415 COLL VENOUS BLD VENIPUNCTURE: CPT

## 2023-11-20 PROCEDURE — 99213 OFFICE O/P EST LOW 20 MIN: CPT | Mod: 25

## 2023-12-04 ENCOUNTER — RESULT REVIEW (OUTPATIENT)
Age: 58
End: 2023-12-04

## 2023-12-04 ENCOUNTER — APPOINTMENT (OUTPATIENT)
Dept: HEMATOLOGY ONCOLOGY | Facility: CLINIC | Age: 58
End: 2023-12-04
Payer: COMMERCIAL

## 2023-12-04 VITALS
WEIGHT: 153 LBS | OXYGEN SATURATION: 98 % | SYSTOLIC BLOOD PRESSURE: 115 MMHG | HEART RATE: 66 BPM | DIASTOLIC BLOOD PRESSURE: 73 MMHG | BODY MASS INDEX: 25.26 KG/M2 | TEMPERATURE: 98.4 F | RESPIRATION RATE: 16 BRPM

## 2023-12-04 PROCEDURE — 99213 OFFICE O/P EST LOW 20 MIN: CPT | Mod: 25

## 2023-12-04 PROCEDURE — 36415 COLL VENOUS BLD VENIPUNCTURE: CPT

## 2023-12-04 RX ORDER — PANTOPRAZOLE SODIUM 20 MG/1
TABLET, DELAYED RELEASE ORAL
Refills: 0 | Status: DISCONTINUED | COMMUNITY
End: 2023-12-04

## 2023-12-07 LAB
FERRITIN SERPL-MCNC: 17 NG/ML
IRON SATN MFR SERPL: 12 %
IRON SERPL-MCNC: 51 UG/DL
TIBC SERPL-MCNC: 423 UG/DL
UIBC SERPL-MCNC: 373 UG/DL

## 2023-12-18 ENCOUNTER — NON-APPOINTMENT (OUTPATIENT)
Age: 58
End: 2023-12-18

## 2023-12-18 ENCOUNTER — APPOINTMENT (OUTPATIENT)
Dept: HEMATOLOGY ONCOLOGY | Facility: CLINIC | Age: 58
End: 2023-12-18

## 2023-12-18 DIAGNOSIS — U07.1 COVID-19: ICD-10-CM

## 2024-01-02 ENCOUNTER — RESULT REVIEW (OUTPATIENT)
Age: 59
End: 2024-01-02

## 2024-01-02 ENCOUNTER — APPOINTMENT (OUTPATIENT)
Dept: HEMATOLOGY ONCOLOGY | Facility: CLINIC | Age: 59
End: 2024-01-02
Payer: COMMERCIAL

## 2024-01-02 VITALS
DIASTOLIC BLOOD PRESSURE: 73 MMHG | BODY MASS INDEX: 25.55 KG/M2 | HEIGHT: 65.25 IN | WEIGHT: 155.2 LBS | RESPIRATION RATE: 16 BRPM | OXYGEN SATURATION: 97 % | TEMPERATURE: 98.1 F | SYSTOLIC BLOOD PRESSURE: 114 MMHG | HEART RATE: 66 BPM

## 2024-01-02 PROCEDURE — 99213 OFFICE O/P EST LOW 20 MIN: CPT | Mod: 25

## 2024-01-02 PROCEDURE — 36415 COLL VENOUS BLD VENIPUNCTURE: CPT

## 2024-01-02 RX ORDER — MOLNUPIRAVIR 200 MG/1
200 CAPSULE ORAL
Qty: 40 | Refills: 0 | Status: DISCONTINUED | COMMUNITY
Start: 2023-12-18 | End: 2024-01-02

## 2024-01-02 NOTE — HISTORY OF PRESENT ILLNESS
[de-identified] : Mr. 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 \par  He presented to GI with history of unexplained weight loss of 35 pounds since last year and diarrhea x nine months. He underwent colonoscopy on 4/25/23 which showed afrond-like/villous and infiltrating obstructing large mass in the rectosigmoid colon with oozing present.\par   CT Chest/abdomen/pelvis done on 4/25/23 showed a mass lesion in the distal sigmoid colon with malignancy with large bowel obstruction.  There were mildly enlarged left lilac chain lymph nodes measuring up to 7 mm which were likely reactive. CT of the chest was negative. for malignancy. Preoperative CEA was normal but he was anemic. Iron studies are not available. . \par   He was referred for low anterior/sigmoid resection. 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 24 lymph nodes were negative for tumor. The pathologic staging was pT3pN0. There was no loss of nuclear expression of MMR proteins with low probability of MSI-H. \par  \par   He reports that his appetite is improving.  He states that sometimes the food "doesn't feel like it's going down"' He takes Pantoprazole for a hiatal hernia.  [de-identified] : Pt presents for high risk Stage II (presented with bowel obstruction) rectosigmoid cancer receiving 5FU leuk eow cycle 6.  Started 6/19/2023 last dose before hospitalization was 8/28/2023 [facility] chemo was recently held for admission for incarcerated inguinal hernia causing small bowel obstruction, with ischemic small bowel, status postsurgical repair.  He will be resuming today. circulating tumor DNA not detected 6/14/23 Discussed that per NCCN guidelines , ct DNA results not actionable at this time ________________________________________________________________________________  Tested COVID + 12/17/23. Had runny nose, fatigue, chills, congestion. No fever. Mild cough no abdominal pain. No diarrhea. Symptoms lasted 4 says

## 2024-01-02 NOTE — REVIEW OF SYSTEMS
[Diarrhea: Grade 0] : Diarrhea: Grade 0 [Negative] : Allergic/Immunologic [de-identified] : right neck sebacous  cyst

## 2024-01-02 NOTE — ASSESSMENT
[FreeTextEntry1] : Mr. 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 He presented to GI with history of unexplained weight loss of 35 pounds since last year and diarrhea x nine months. He underwent colonoscopy on 4/25/23 which showed afrond-like/villous and infiltrating obstructing large mass in the rectosigmoid colon with oozing present.  CT Chest/abdomen/pelvis done on 4/25/23 showed a mass lesion in the distal sigmoid colon with malignancy with large bowel obstruction. There were mildly enlarged left lilac chain lymph nodes measuring up to 7 mm which were likely reactive. CT of the chest was negative. for malignancy. Preoperative CEA was normal but he was anemic.  He was referred for low anterior/sigmoid resection. 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 24 lymph nodes were negative for tumor. No. of tumor buds 5 per hotspot field, no LVI. No PNI The pathologic staging was pT3pN0. There was no loss of nuclear expression of MMR proteins with low probability of MSI-H.  C1 infusional 5- FU 6/19/23  CEA --normal 10/3/23   Plan:  - 5- FU/Leucovorin C11  today - venofer 200mg IVSS Q 2 weeks x 5 doses ---deferring it till after chemo -colonscopy -- 2/20/24 CT scans Q 6 months x 2 yrs. then annually Monitor  CEA   RTC 2 weeks

## 2024-01-02 NOTE — PHYSICAL EXAM
[Restricted in physically strenuous activity but ambulatory and able to carry out work of a light or sedentary nature] : Status 1- Restricted in physically strenuous activity but ambulatory and able to carry out work of a light or sedentary nature, e.g., light house work, office work [Normal] : affect appropriate [de-identified] : sebaceous cyst in the nape of the neck

## 2024-01-11 ENCOUNTER — APPOINTMENT (OUTPATIENT)
Dept: SURGERY | Facility: CLINIC | Age: 59
End: 2024-01-11
Payer: COMMERCIAL

## 2024-01-11 VITALS
SYSTOLIC BLOOD PRESSURE: 113 MMHG | WEIGHT: 151 LBS | HEART RATE: 62 BPM | DIASTOLIC BLOOD PRESSURE: 77 MMHG | HEIGHT: 65.25 IN | BODY MASS INDEX: 24.85 KG/M2

## 2024-01-11 DIAGNOSIS — Z87.19 OTHER SPECIFIED POSTPROCEDURAL STATES: ICD-10-CM

## 2024-01-11 DIAGNOSIS — Z98.890 OTHER SPECIFIED POSTPROCEDURAL STATES: ICD-10-CM

## 2024-01-11 DIAGNOSIS — Z90.49 ACQUIRED ABSENCE OF OTHER SPECIFIED PARTS OF DIGESTIVE TRACT: ICD-10-CM

## 2024-01-11 PROCEDURE — 99214 OFFICE O/P EST MOD 30 MIN: CPT

## 2024-01-16 ENCOUNTER — RESULT REVIEW (OUTPATIENT)
Age: 59
End: 2024-01-16

## 2024-01-16 ENCOUNTER — APPOINTMENT (OUTPATIENT)
Dept: HEMATOLOGY ONCOLOGY | Facility: CLINIC | Age: 59
End: 2024-01-16
Payer: COMMERCIAL

## 2024-01-16 VITALS
WEIGHT: 153 LBS | HEIGHT: 65.25 IN | OXYGEN SATURATION: 96 % | DIASTOLIC BLOOD PRESSURE: 68 MMHG | TEMPERATURE: 98.5 F | HEART RATE: 84 BPM | SYSTOLIC BLOOD PRESSURE: 119 MMHG | RESPIRATION RATE: 16 BRPM | BODY MASS INDEX: 25.19 KG/M2

## 2024-01-16 PROCEDURE — 99214 OFFICE O/P EST MOD 30 MIN: CPT

## 2024-01-16 RX ORDER — ONDANSETRON 8 MG/1
8 TABLET ORAL
Qty: 30 | Refills: 2 | Status: DISCONTINUED | COMMUNITY
Start: 2023-06-20 | End: 2024-01-16

## 2024-01-20 NOTE — HISTORY OF PRESENT ILLNESS
[de-identified] : Mr. Dempsey is a 58 year old male who is referred for initial consultation for rectosigmoid carcinoma.  He presented to GI with history of unexplained weight loss of 35 pounds since last year and diarrhea x nine months. He underwent colonoscopy on 4/25/23 which showed afrond-like/villous and infiltrating obstructing large mass in the rectosigmoid colon with oozing present.  CT Chest/abdomen/pelvis done on 4/25/23 showed a mass lesion in the distal sigmoid colon with malignancy with large bowel obstruction.  There were mildly enlarged left lilac chain lymph nodes measuring up to 7 mm which were likely reactive. CT of the chest was negative. for malignancy. Preoperative CEA was normal but he was anemic. Iron studies are not available. .   He was referred for low anterior/sigmoid resection.  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 24 lymph nodes were negative for tumor. The pathologic staging was pT3pN0. There was no loss of nuclear expression of MMR proteins with low probability of MSI-H.    He reports that his appetite is improving.  He states that sometimes the food "doesn't feel like it's going down"' He takes Pantoprazole for a hiatal hernia.  [de-identified] : Pt presents for high risk Stage II (presented with bowel obstruction) rectosigmoid cancer receiving 5FU leuk eow cycle 6.  Started 6/19/2023 last dose before hospitalization was 8/28/2023 [facility] chemo was held for admission for incarcerated inguinal hernia causing small bowel obstruction, with ischemic small bowel, status postsurgical repair.  Therefore completion was delayed.  His last cycle is today.  He underwent colonoscopy 4/23. He was found to be iron deficient in late dec but has been deferring treatment until after chemo was completed.  circulating tumor DNA not detected 6/14/23 Discussed that per NCCN guidelines , ct DNA results not actionable at this time ________________________________________________________________________________  Tested COVID + 12/17/23. Had runny nose, fatigue, chills, congestion. No fever. Mild cough no abdominal pain. No diarrhea. Symptoms lasted 4 says

## 2024-01-20 NOTE — REVIEW OF SYSTEMS
[Diarrhea: Grade 0] : Diarrhea: Grade 0 [Negative] : Allergic/Immunologic [de-identified] : right neck sebacous  cyst

## 2024-01-20 NOTE — PHYSICAL EXAM
[Restricted in physically strenuous activity but ambulatory and able to carry out work of a light or sedentary nature] : Status 1- Restricted in physically strenuous activity but ambulatory and able to carry out work of a light or sedentary nature, e.g., light house work, office work [Normal] : affect appropriate [de-identified] : sebaceous cyst in the nape of the neck

## 2024-01-20 NOTE — ASSESSMENT
[FreeTextEntry1] : Mr. 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 He presented to GI with history of unexplained weight loss of 35 pounds since last year and diarrhea x nine months. He underwent colonoscopy on 4/25/23 which showed afrond-like/villous and infiltrating obstructing large mass in the rectosigmoid colon with oozing present.  CT Chest/abdomen/pelvis done on 4/25/23 showed a mass lesion in the distal sigmoid colon with malignancy with large bowel obstruction. There were mildly enlarged left lilac chain lymph nodes measuring up to 7 mm which were likely reactive. CT of the chest was negative. for malignancy. Preoperative CEA was normal but he was anemic.  He was referred for low anterior/sigmoid resection. 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 24 lymph nodes were negative for tumor. No. of tumor buds 5 per hotspot field, no LVI. No PNI The pathologic staging was pT3pN0. There was no loss of nuclear expression of MMR proteins with low probability of MSI-H.  C1 infusional 5- FU 6/19/23  CEA --normal 10/3/23   Plan: - 5- FU/Leucovorin completed today Labs ordered, drawn in the office, reviewed and are adequate for treatment  - Venofer 200mg IVSS Q 2 weeks x 5 doses ---deferring it till after chemo. Pt trying to fit infusions into work schedule.  -colonscopy -- 2/20/24 - CT scans Q 6 months x 2 yrs. then annually - Monitor  CEA  - Continue routine, age-appropriate, healthcare maintenance  - History of present illness, review of systems, physical exam and treatment plan reviewed with Dr. Lantigua   - Office visit in 2 weeks or prn for new or worsening symptoms.

## 2024-01-29 ENCOUNTER — RESULT REVIEW (OUTPATIENT)
Age: 59
End: 2024-01-29

## 2024-01-29 ENCOUNTER — APPOINTMENT (OUTPATIENT)
Dept: HEMATOLOGY ONCOLOGY | Facility: CLINIC | Age: 59
End: 2024-01-29
Payer: COMMERCIAL

## 2024-01-29 VITALS
HEART RATE: 72 BPM | HEIGHT: 65.25 IN | RESPIRATION RATE: 16 BRPM | BODY MASS INDEX: 25.19 KG/M2 | DIASTOLIC BLOOD PRESSURE: 76 MMHG | WEIGHT: 153 LBS | TEMPERATURE: 97.5 F | OXYGEN SATURATION: 99 % | SYSTOLIC BLOOD PRESSURE: 118 MMHG

## 2024-01-29 PROBLEM — Z98.890 STATUS POST INGUINAL HERNIA REPAIR USING SYNTHETIC PATCH: Status: ACTIVE | Noted: 2023-09-21

## 2024-01-29 PROBLEM — Z90.49 STATUS POST SMALL BOWEL RESECTION: Status: ACTIVE | Noted: 2023-09-21

## 2024-01-29 PROCEDURE — 99214 OFFICE O/P EST MOD 30 MIN: CPT

## 2024-01-29 RX ORDER — FERROUS SULFATE 137(45) MG
142 (45 FE) TABLET, EXTENDED RELEASE ORAL
Refills: 0 | Status: ACTIVE | COMMUNITY

## 2024-01-29 NOTE — PHYSICAL EXAM
[Restricted in physically strenuous activity but ambulatory and able to carry out work of a light or sedentary nature] : Status 1- Restricted in physically strenuous activity but ambulatory and able to carry out work of a light or sedentary nature, e.g., light house work, office work [Normal] : affect appropriate [de-identified] : sebaceous cyst in the nape of the neck [de-identified] : port in place

## 2024-01-29 NOTE — PHYSICAL EXAM
[Normal Breath Sounds] : Normal breath sounds [Normal Heart Sounds] : normal heart sounds [de-identified] : AAOX5 [de-identified] : WNL [de-identified] : CISCOL [de-identified] :   Citizen of [place]  VENTRAL HRNIA WITH MULTIPLE DEFECTS ALL REDUCIBLE

## 2024-01-29 NOTE — REVIEW OF SYSTEMS
[Diarrhea: Grade 0] : Diarrhea: Grade 0 [Negative] : Allergic/Immunologic [de-identified] : right neck sebacous  cyst

## 2024-01-29 NOTE — ASSESSMENT
[FreeTextEntry1] : Mr. 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   Now under surveillance  Plan: >CBC, CMP, CEA, and iron studies (ferritin, TIBC, iron) every 3 months  >CT scans Q 6 months x 2 yrs. then annually for up to 5 years (ordered this visit to be done now) >Colonoscopy -- 2/20/24 > Continue po iron every other day with Vitamin C to avoid constipation > Port flushes every 8 weeks > Continue routine, age-appropriate, healthcare maintenance  Return to clinic in 3 months

## 2024-01-29 NOTE — HISTORY OF PRESENT ILLNESS
[ECOG Performance Status: 1 - Restricted in physically strenuous activity but ambulatory and able to carry out work of a light or sedentary nature] : Performance Status: 1 - Restricted in physically strenuous activity but ambulatory and able to carry out work of a light or sedentary nature, e.g., light house work, office work [de-identified] : Mr. 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   ONC HX: >4/25/2023: 35 pound weight loss and diarrhea and anemia. > Colonoscopy: a frond-like/villous and infiltrating obstructing large mass in the rectosigmoid colon with oozing present. > CT C/A/P showed a mass lesion in the distal sigmoid colon with malignancy with large bowel obstruction.  There were mildly enlarged left lilac chain lymph nodes measuring up to 7 mm which were likely reactive. CT of the chest was negative. for malignancy.  >Preoperative CEA was normal 10/2023 >s/p low anterior/sigmoid resection  PATHOLOGY: 5/2/23  >Moderately differentiated adenocarcinoma in the rectosigmoid with mucinous features invading through the muscularis propria into the pericolonic or perirectal tissue.  >24 lymph nodes were negative for tumor.  > pT3pN0. OMER >Circulating tumor DNA not detected 6/14/23- Discussed that per NCCN guidelines, CT DNA results not actionable at this time  TREATMENT HX: Adjuvant infusional 5- FU/Leucovorin X 12 cycles (6/2023- 1/2024) Delayed due to hospitalization for incarcerated inguinal hernia  [de-identified] : Patient feels very well. Denies any complaints. Has been taking po iron for the past 3 weeks. Would like to try oral iron instead of IV iron at this time. Due for repeat Colonoscopy on 2/20/24 Patient states that he may have to undergo a repeat surgery for his inguinal hernia at some point

## 2024-01-30 NOTE — REVIEW OF SYSTEMS
[Diarrhea: Grade 0] : Diarrhea: Grade 0 [Constipation] : constipation [Negative] : ENT no [de-identified] : some orange streaking of his palms with 5 FU, right neck cyst

## 2024-01-31 LAB — CEA SERPL-MCNC: 1.1 NG/ML

## 2024-02-07 ENCOUNTER — NON-APPOINTMENT (OUTPATIENT)
Age: 59
End: 2024-02-07

## 2024-03-12 ENCOUNTER — APPOINTMENT (OUTPATIENT)
Dept: HEMATOLOGY ONCOLOGY | Facility: CLINIC | Age: 59
End: 2024-03-12

## 2024-03-19 ENCOUNTER — NON-APPOINTMENT (OUTPATIENT)
Age: 59
End: 2024-03-19

## 2024-03-19 ENCOUNTER — APPOINTMENT (OUTPATIENT)
Dept: HEMATOLOGY ONCOLOGY | Facility: CLINIC | Age: 59
End: 2024-03-19

## 2024-03-19 ENCOUNTER — RESULT REVIEW (OUTPATIENT)
Age: 59
End: 2024-03-19

## 2024-03-19 VITALS
TEMPERATURE: 97.7 F | OXYGEN SATURATION: 98 % | DIASTOLIC BLOOD PRESSURE: 70 MMHG | HEIGHT: 62 IN | BODY MASS INDEX: 28.16 KG/M2 | WEIGHT: 153 LBS | SYSTOLIC BLOOD PRESSURE: 109 MMHG | RESPIRATION RATE: 18 BRPM | HEART RATE: 59 BPM

## 2024-04-01 ENCOUNTER — NON-APPOINTMENT (OUTPATIENT)
Age: 59
End: 2024-04-01

## 2024-04-15 ENCOUNTER — RESULT REVIEW (OUTPATIENT)
Age: 59
End: 2024-04-15

## 2024-04-22 ENCOUNTER — RESULT REVIEW (OUTPATIENT)
Age: 59
End: 2024-04-22

## 2024-04-22 ENCOUNTER — LABORATORY RESULT (OUTPATIENT)
Age: 59
End: 2024-04-22

## 2024-04-22 ENCOUNTER — APPOINTMENT (OUTPATIENT)
Dept: HEMATOLOGY ONCOLOGY | Facility: CLINIC | Age: 59
End: 2024-04-22
Payer: COMMERCIAL

## 2024-04-22 VITALS
TEMPERATURE: 97.6 F | WEIGHT: 154 LBS | BODY MASS INDEX: 28.34 KG/M2 | SYSTOLIC BLOOD PRESSURE: 133 MMHG | RESPIRATION RATE: 18 BRPM | OXYGEN SATURATION: 97 % | DIASTOLIC BLOOD PRESSURE: 79 MMHG | HEIGHT: 62 IN | HEART RATE: 61 BPM

## 2024-04-22 DIAGNOSIS — E61.1 IRON DEFICIENCY: ICD-10-CM

## 2024-04-22 DIAGNOSIS — C19 MALIGNANT NEOPLASM OF RECTOSIGMOID JUNCTION: ICD-10-CM

## 2024-04-22 PROCEDURE — 99214 OFFICE O/P EST MOD 30 MIN: CPT

## 2024-04-22 RX ORDER — PANTOPRAZOLE 40 MG/1
40 TABLET, DELAYED RELEASE ORAL DAILY
Qty: 30 | Refills: 1 | Status: COMPLETED | COMMUNITY
Start: 2023-12-04 | End: 2024-04-22

## 2024-04-23 PROBLEM — E61.1 IRON DEFICIENCY: Status: ACTIVE | Noted: 2023-07-23

## 2024-04-23 PROBLEM — C19 CARCINOMA OF RECTOSIGMOID (COLON): Status: ACTIVE | Noted: 2023-05-24

## 2024-04-23 NOTE — REVIEW OF SYSTEMS
[Diarrhea: Grade 0] : Diarrhea: Grade 0 [Negative] : Allergic/Immunologic [de-identified] : right neck sebacous  cyst

## 2024-04-23 NOTE — ASSESSMENT
[FreeTextEntry1] : Mr. 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   Now under surveillance  Plan: >CBC, CMP, CEA, and iron studies (ferritin, TIBC, iron) every 3 months  >CT scans Q 6 months x 2 yrs. then annually for up to 5 years (ordered this visit to be done now) >Colonoscopy -- 2/20/24 > Continue po iron every other day with Vitamin C to avoid constipation > Port flushes every 8 weeks > Reviewed results of CT done on 4/16/2024 which were negative.  The patient is advised to continue follow-up with surgery regarding small hernias seen on the study.  Currently asymptomatic. Reviewed results of lab work which showed improvement in hemoglobin to 13.8.  Continue iron as above. > Continue routine, age-appropriate, healthcare maintenance > Office visit in 2 weeks or prn for new or worsening symptoms.

## 2024-04-23 NOTE — HISTORY OF PRESENT ILLNESS
[ECOG Performance Status: 1 - Restricted in physically strenuous activity but ambulatory and able to carry out work of a light or sedentary nature] : Performance Status: 1 - Restricted in physically strenuous activity but ambulatory and able to carry out work of a light or sedentary nature, e.g., light house work, office work [de-identified] : Mr. 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   ONC HX: >4/25/2023: 35 pound weight loss and diarrhea and anemia. > Colonoscopy: a frond-like/villous and infiltrating obstructing large mass in the rectosigmoid colon with oozing present. > CT C/A/P showed a mass lesion in the distal sigmoid colon with malignancy with large bowel obstruction.  There were mildly enlarged left lilac chain lymph nodes measuring up to 7 mm which were likely reactive. CT of the chest was negative. for malignancy.  >Preoperative CEA was normal 10/2023 >s/p low anterior/sigmoid resection  PATHOLOGY: 5/2/23  >Moderately differentiated adenocarcinoma in the rectosigmoid with mucinous features invading through the muscularis propria into the pericolonic or perirectal tissue.  >24 lymph nodes were negative for tumor.  > pT3pN0. OMER >Circulating tumor DNA not detected 6/14/23- Discussed that per NCCN guidelines, CT DNA results not actionable at this time  TREATMENT HX: Adjuvant infusional 5- FU/Leucovorin X 12 cycles (6/2023- 1/2024) Delayed due to hospitalization for incarcerated inguinal hernia  [de-identified] : The patient presents for follow-up of high risk stage II rectosigmoid cancer status post 5-FU leucovorin and resection. He presents to discuss results of his CAT scan done on 4/16/2024, which was negative for recurrence or metastatic disease.  He has had nausea and diarrhea since the CAT scans which has slightly improved but he still continues to have loose stools. He is also complaining of dysuria with frequency.  He denies burning.   He is consulting with his surgeon regarding hernias noted on his CT.  Patient feels very well. Denies any complaints.

## 2024-04-23 NOTE — PHYSICAL EXAM
[Restricted in physically strenuous activity but ambulatory and able to carry out work of a light or sedentary nature] : Status 1- Restricted in physically strenuous activity but ambulatory and able to carry out work of a light or sedentary nature, e.g., light house work, office work [Normal] : affect appropriate [de-identified] : sebaceous cyst in the nape of the neck [de-identified] : port in place

## 2024-05-09 ENCOUNTER — APPOINTMENT (OUTPATIENT)
Dept: SURGERY | Facility: CLINIC | Age: 59
End: 2024-05-09
Payer: COMMERCIAL

## 2024-05-09 VITALS
HEIGHT: 62 IN | SYSTOLIC BLOOD PRESSURE: 122 MMHG | BODY MASS INDEX: 27.97 KG/M2 | HEART RATE: 60 BPM | WEIGHT: 152 LBS | DIASTOLIC BLOOD PRESSURE: 81 MMHG

## 2024-05-09 DIAGNOSIS — K43.2 INCISIONAL HERNIA W/OUT OBSTRUCTION OR GANGRENE: ICD-10-CM

## 2024-05-09 PROCEDURE — 99214 OFFICE O/P EST MOD 30 MIN: CPT

## 2024-05-29 PROBLEM — K43.2 INCISIONAL HERNIA, WITHOUT OBSTRUCTION OR GANGRENE: Status: ACTIVE | Noted: 2024-01-29

## 2024-05-29 NOTE — ASSESSMENT
[FreeTextEntry1] : doing well, multiple points of recurrent along incision of colon surgery. Once CT scan for evaluation of the colon cancer is complete we will review options  and discuss possible need for surgery unless he becomes more symptomatic

## 2024-05-29 NOTE — HISTORY OF PRESENT ILLNESS
[de-identified] : 58 yr old male s/p colon resection for Colon Ca, presented 3 months postop with an incarcerated hernia in the inferior left aspect of the incision requiring emergency surgery with bowel resection and repair with a phasix mesh. He presents for routine follow up has completed chemo and is planning of a routine Scan  . He currently has no symptoms and is physically active gardening

## 2024-05-29 NOTE — PHYSICAL EXAM
[Normal Breath Sounds] : Normal breath sounds [Normal Heart Sounds] : normal heart sounds [de-identified] : AAOX5 [de-identified] : WNL [de-identified] : CISCOL [de-identified] :   [place] CHE[place]  VENTRAL HRNIA WITH MULTIPLE DEFECTS ALL REDUCIBLE

## 2024-06-17 ENCOUNTER — APPOINTMENT (OUTPATIENT)
Dept: HEMATOLOGY ONCOLOGY | Facility: CLINIC | Age: 59
End: 2024-06-17

## 2024-06-17 VITALS
BODY MASS INDEX: 28.52 KG/M2 | DIASTOLIC BLOOD PRESSURE: 70 MMHG | HEART RATE: 56 BPM | WEIGHT: 155 LBS | HEIGHT: 62 IN | RESPIRATION RATE: 18 BRPM | TEMPERATURE: 97.9 F | SYSTOLIC BLOOD PRESSURE: 115 MMHG | OXYGEN SATURATION: 98 %

## 2024-07-22 ENCOUNTER — APPOINTMENT (OUTPATIENT)
Dept: HEMATOLOGY ONCOLOGY | Facility: CLINIC | Age: 59
End: 2024-07-22
Payer: COMMERCIAL

## 2024-07-22 ENCOUNTER — LABORATORY RESULT (OUTPATIENT)
Age: 59
End: 2024-07-22

## 2024-07-22 ENCOUNTER — RESULT REVIEW (OUTPATIENT)
Age: 59
End: 2024-07-22

## 2024-07-22 VITALS
OXYGEN SATURATION: 97 % | WEIGHT: 159 LBS | HEIGHT: 62 IN | DIASTOLIC BLOOD PRESSURE: 71 MMHG | BODY MASS INDEX: 29.26 KG/M2 | TEMPERATURE: 97.7 F | RESPIRATION RATE: 18 BRPM | SYSTOLIC BLOOD PRESSURE: 117 MMHG | HEART RATE: 62 BPM

## 2024-07-22 DIAGNOSIS — C19 MALIGNANT NEOPLASM OF RECTOSIGMOID JUNCTION: ICD-10-CM

## 2024-07-22 PROCEDURE — 99214 OFFICE O/P EST MOD 30 MIN: CPT

## 2024-07-22 PROCEDURE — G2211 COMPLEX E/M VISIT ADD ON: CPT | Mod: NC

## 2024-07-22 NOTE — PHYSICAL EXAM
[de-identified] : large sebaceous cyst in the nape of the neck [de-identified] : port in place

## 2024-07-22 NOTE — ASSESSMENT
[FreeTextEntry1] : Mr. 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   Now under surveillance Colonoscopy -- 2/20/24- Dr. Marks -[facility] CT C/A/P 4/2024- [facility]  Plan: >CBC, CMP, CEA, and iron studies (ferritin, TIBC, iron) every 3 months  >CT scans Q 6 months x 2 yrs. then annually for up to 5 years (ordered this visit to be done in October) > Port flushes every 8 weeks > follow-up with surgery for hernias >follow up with dermatology for large sebaceous cyst/lipoma on nape of neck. Patient would like it removed. Has pending appt 8/2024    Ct scan in 10/2024 with follow up visit to discuss results

## 2024-07-22 NOTE — HISTORY OF PRESENT ILLNESS
[de-identified] : Mr. 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   ONC HX: >4/25/2023: 35 pound weight loss and diarrhea and anemia. > Colonoscopy: a frond-like/villous and infiltrating obstructing large mass in the rectosigmoid colon with oozing present. > CT C/A/P showed a mass lesion in the distal sigmoid colon with malignancy with large bowel obstruction.  There were mildly enlarged left lilac chain lymph nodes measuring up to 7 mm which were likely reactive. CT of the chest was negative. for malignancy.  >Preoperative CEA was normal 10/2023 >s/p low anterior/sigmoid resection  PATHOLOGY: 5/2/23  >Moderately differentiated adenocarcinoma in the rectosigmoid with mucinous features invading through the muscularis propria into the pericolonic or perirectal tissue.  >24 lymph nodes were negative for tumor.  > pT3pN0. [facility] >Circulating tumor DNA not detected 6/14/23- Discussed that per [facility] guidelines, CT DNA results not actionable at this time  TREATMENT HX: Adjuvant infusional 5- FU/Leucovorin X 12 cycles (6/2023- 1/2024) Delayed due to hospitalization for incarcerated inguinal hernia  CT C/A/P 4/16/2024 [facility]  [de-identified] : The patient presents for follow-up of high-risk stage II rectosigmoid cancer status post 5-FU leucovorin and resection.  Patient feels overall well.  Was seen by surgery for hernia. Considering getting hernia repair including hiatal hernia Has noticed increase reflux once in a while Denies any other complaints.  [0 - No Distress] : Distress Level: 0

## 2024-08-01 ENCOUNTER — OFFICE (OUTPATIENT)
Dept: URBAN - METROPOLITAN AREA CLINIC 29 | Facility: CLINIC | Age: 59
Setting detail: OPHTHALMOLOGY
End: 2024-08-01
Payer: COMMERCIAL

## 2024-08-01 DIAGNOSIS — H35.463: ICD-10-CM

## 2024-08-01 DIAGNOSIS — H01.001: ICD-10-CM

## 2024-08-01 DIAGNOSIS — H01.004: ICD-10-CM

## 2024-08-01 DIAGNOSIS — H52.4: ICD-10-CM

## 2024-08-01 DIAGNOSIS — H40.053: ICD-10-CM

## 2024-08-01 PROCEDURE — 92083 EXTENDED VISUAL FIELD XM: CPT | Performed by: OPHTHALMOLOGY

## 2024-08-01 PROCEDURE — 92014 COMPRE OPH EXAM EST PT 1/>: CPT | Performed by: OPHTHALMOLOGY

## 2024-08-01 PROCEDURE — 92015 DETERMINE REFRACTIVE STATE: CPT | Performed by: OPHTHALMOLOGY

## 2024-08-01 PROCEDURE — 92250 FUNDUS PHOTOGRAPHY W/I&R: CPT | Performed by: OPHTHALMOLOGY

## 2024-08-01 ASSESSMENT — CONFRONTATIONAL VISUAL FIELD TEST (CVF)
OD_FINDINGS: FULL
OS_FINDINGS: FULL

## 2024-08-01 ASSESSMENT — LID EXAM ASSESSMENTS
OD_COMMENTS: BLEPHARITIS CHANGES OF THE EYELID MARGINS
OS_COMMENTS: BLEPHARITIS CHANGES OF THE EYELID MARGINS

## 2024-08-12 ENCOUNTER — APPOINTMENT (OUTPATIENT)
Dept: SURGERY | Facility: CLINIC | Age: 59
End: 2024-08-12
Payer: COMMERCIAL

## 2024-08-12 VITALS
DIASTOLIC BLOOD PRESSURE: 74 MMHG | TEMPERATURE: 97.6 F | WEIGHT: 159 LBS | HEIGHT: 62 IN | SYSTOLIC BLOOD PRESSURE: 120 MMHG | HEART RATE: 55 BPM | BODY MASS INDEX: 29.26 KG/M2

## 2024-08-12 DIAGNOSIS — L72.3 SEBACEOUS CYST: ICD-10-CM

## 2024-08-12 DIAGNOSIS — K43.2 INCISIONAL HERNIA W/OUT OBSTRUCTION OR GANGRENE: ICD-10-CM

## 2024-08-12 DIAGNOSIS — Z90.49 ACQUIRED ABSENCE OF OTHER SPECIFIED PARTS OF DIGESTIVE TRACT: ICD-10-CM

## 2024-08-12 PROCEDURE — 99214 OFFICE O/P EST MOD 30 MIN: CPT

## 2024-08-13 ENCOUNTER — APPOINTMENT (OUTPATIENT)
Dept: UROLOGY | Facility: CLINIC | Age: 59
End: 2024-08-13

## 2024-08-15 PROBLEM — L72.3 SEBACEOUS CYST: Status: ACTIVE | Noted: 2024-08-15

## 2024-08-15 NOTE — PHYSICAL EXAM
[Normal Breath Sounds] : Normal breath sounds [Normal Heart Sounds] : normal heart sounds [de-identified] : WNL [de-identified] : 3x4 cm sebaceous cyst in the posterior neck [de-identified] :  soft  nontender , hernia well healed

## 2024-08-29 ENCOUNTER — APPOINTMENT (OUTPATIENT)
Dept: HEMATOLOGY ONCOLOGY | Facility: CLINIC | Age: 59
End: 2024-08-29

## 2024-08-29 VITALS
RESPIRATION RATE: 18 BRPM | HEIGHT: 62 IN | HEART RATE: 69 BPM | SYSTOLIC BLOOD PRESSURE: 125 MMHG | DIASTOLIC BLOOD PRESSURE: 81 MMHG | WEIGHT: 163.19 LBS | TEMPERATURE: 96.3 F | BODY MASS INDEX: 30.03 KG/M2 | OXYGEN SATURATION: 97 %

## 2024-09-06 ENCOUNTER — APPOINTMENT (OUTPATIENT)
Dept: SURGERY | Facility: HOSPITAL | Age: 59
End: 2024-09-06

## 2024-09-06 ENCOUNTER — RESULT REVIEW (OUTPATIENT)
Age: 59
End: 2024-09-06

## 2024-09-06 ENCOUNTER — TRANSCRIPTION ENCOUNTER (OUTPATIENT)
Age: 59
End: 2024-09-06

## 2024-09-06 PROCEDURE — 11446 EXC FACE-MM B9+MARG >4 CM: CPT

## 2024-09-12 ENCOUNTER — NON-APPOINTMENT (OUTPATIENT)
Age: 59
End: 2024-09-12

## 2024-09-19 ENCOUNTER — APPOINTMENT (OUTPATIENT)
Dept: SURGERY | Facility: CLINIC | Age: 59
End: 2024-09-19

## 2024-09-19 VITALS
TEMPERATURE: 97.4 F | OXYGEN SATURATION: 98 % | WEIGHT: 158 LBS | SYSTOLIC BLOOD PRESSURE: 121 MMHG | BODY MASS INDEX: 29.08 KG/M2 | HEART RATE: 85 BPM | HEIGHT: 62 IN | DIASTOLIC BLOOD PRESSURE: 78 MMHG

## 2024-09-19 PROCEDURE — 99024 POSTOP FOLLOW-UP VISIT: CPT

## 2024-10-17 DIAGNOSIS — C19 MALIGNANT NEOPLASM OF RECTOSIGMOID JUNCTION: ICD-10-CM

## 2024-10-17 DIAGNOSIS — E61.1 IRON DEFICIENCY: ICD-10-CM

## 2024-11-01 ENCOUNTER — APPOINTMENT (OUTPATIENT)
Dept: HEMATOLOGY ONCOLOGY | Facility: CLINIC | Age: 59
End: 2024-11-01

## 2024-11-01 ENCOUNTER — NON-APPOINTMENT (OUTPATIENT)
Age: 59
End: 2024-11-01

## 2024-11-06 ENCOUNTER — RESULT REVIEW (OUTPATIENT)
Age: 59
End: 2024-11-06

## 2024-11-11 ENCOUNTER — RESULT REVIEW (OUTPATIENT)
Age: 59
End: 2024-11-11

## 2024-11-11 ENCOUNTER — LABORATORY RESULT (OUTPATIENT)
Age: 59
End: 2024-11-11

## 2024-11-11 ENCOUNTER — APPOINTMENT (OUTPATIENT)
Dept: HEMATOLOGY ONCOLOGY | Facility: CLINIC | Age: 59
End: 2024-11-11
Payer: COMMERCIAL

## 2024-11-11 VITALS
HEART RATE: 58 BPM | BODY MASS INDEX: 30.58 KG/M2 | SYSTOLIC BLOOD PRESSURE: 133 MMHG | TEMPERATURE: 98.1 F | HEIGHT: 62 IN | DIASTOLIC BLOOD PRESSURE: 78 MMHG | WEIGHT: 166.19 LBS

## 2024-11-11 DIAGNOSIS — E61.1 IRON DEFICIENCY: ICD-10-CM

## 2024-11-11 DIAGNOSIS — C19 MALIGNANT NEOPLASM OF RECTOSIGMOID JUNCTION: ICD-10-CM

## 2024-11-11 PROCEDURE — 99215 OFFICE O/P EST HI 40 MIN: CPT

## 2024-11-11 PROCEDURE — G2211 COMPLEX E/M VISIT ADD ON: CPT | Mod: NC

## 2024-11-12 ENCOUNTER — APPOINTMENT (OUTPATIENT)
Dept: HEMATOLOGY ONCOLOGY | Facility: CLINIC | Age: 59
End: 2024-11-12

## 2024-12-25 PROBLEM — F10.90 ALCOHOL USE: Status: ACTIVE | Noted: 2023-05-24

## 2025-01-14 ENCOUNTER — RESULT REVIEW (OUTPATIENT)
Age: 60
End: 2025-01-14

## 2025-01-14 ENCOUNTER — APPOINTMENT (OUTPATIENT)
Dept: HEMATOLOGY ONCOLOGY | Facility: CLINIC | Age: 60
End: 2025-01-14
Payer: COMMERCIAL

## 2025-01-14 DIAGNOSIS — E61.1 IRON DEFICIENCY: ICD-10-CM

## 2025-01-14 DIAGNOSIS — C19 MALIGNANT NEOPLASM OF RECTOSIGMOID JUNCTION: ICD-10-CM

## 2025-01-14 PROCEDURE — 99214 OFFICE O/P EST MOD 30 MIN: CPT

## 2025-01-21 LAB
FERRITIN SERPL-MCNC: 38 NG/ML
FOLATE SERPL-MCNC: >20 NG/ML
IRON SATN MFR SERPL: 26 %
IRON SERPL-MCNC: 100 UG/DL
TIBC SERPL-MCNC: 384 UG/DL
UIBC SERPL-MCNC: 284 UG/DL
VIT B12 SERPL-MCNC: 579 PG/ML

## 2025-01-22 ENCOUNTER — NON-APPOINTMENT (OUTPATIENT)
Age: 60
End: 2025-01-22

## 2025-02-18 ENCOUNTER — APPOINTMENT (OUTPATIENT)
Dept: HEMATOLOGY ONCOLOGY | Facility: CLINIC | Age: 60
End: 2025-02-18
Payer: COMMERCIAL

## 2025-02-18 ENCOUNTER — RESULT REVIEW (OUTPATIENT)
Age: 60
End: 2025-02-18

## 2025-02-18 VITALS
OXYGEN SATURATION: 98 % | TEMPERATURE: 97.5 F | SYSTOLIC BLOOD PRESSURE: 113 MMHG | WEIGHT: 165 LBS | DIASTOLIC BLOOD PRESSURE: 72 MMHG | HEIGHT: 62 IN | BODY MASS INDEX: 30.36 KG/M2 | RESPIRATION RATE: 16 BRPM | HEART RATE: 57 BPM

## 2025-02-18 DIAGNOSIS — R59.0 LOCALIZED ENLARGED LYMPH NODES: ICD-10-CM

## 2025-02-18 DIAGNOSIS — C19 MALIGNANT NEOPLASM OF RECTOSIGMOID JUNCTION: ICD-10-CM

## 2025-02-18 DIAGNOSIS — L72.3 SEBACEOUS CYST: ICD-10-CM

## 2025-02-18 PROCEDURE — 99214 OFFICE O/P EST MOD 30 MIN: CPT

## 2025-02-18 PROCEDURE — G2211 COMPLEX E/M VISIT ADD ON: CPT | Mod: NC

## 2025-02-18 RX ORDER — ONDANSETRON 8 MG/1
8 TABLET, ORALLY DISINTEGRATING ORAL
Qty: 10 | Refills: 3 | Status: ACTIVE | COMMUNITY
Start: 2025-02-18 | End: 2025-03-30

## 2025-04-25 ENCOUNTER — RESULT REVIEW (OUTPATIENT)
Age: 60
End: 2025-04-25

## 2025-05-20 ENCOUNTER — APPOINTMENT (OUTPATIENT)
Dept: HEMATOLOGY ONCOLOGY | Facility: CLINIC | Age: 60
End: 2025-05-20
Payer: COMMERCIAL

## 2025-05-20 ENCOUNTER — RESULT REVIEW (OUTPATIENT)
Age: 60
End: 2025-05-20

## 2025-05-20 VITALS
SYSTOLIC BLOOD PRESSURE: 124 MMHG | RESPIRATION RATE: 17 BRPM | OXYGEN SATURATION: 96 % | TEMPERATURE: 97.7 F | DIASTOLIC BLOOD PRESSURE: 79 MMHG | WEIGHT: 161 LBS | BODY MASS INDEX: 29.63 KG/M2 | HEART RATE: 54 BPM | HEIGHT: 62 IN

## 2025-05-20 DIAGNOSIS — E61.1 IRON DEFICIENCY: ICD-10-CM

## 2025-05-20 DIAGNOSIS — C19 MALIGNANT NEOPLASM OF RECTOSIGMOID JUNCTION: ICD-10-CM

## 2025-05-20 PROCEDURE — G2211 COMPLEX E/M VISIT ADD ON: CPT | Mod: NC

## 2025-05-20 PROCEDURE — 99215 OFFICE O/P EST HI 40 MIN: CPT

## 2025-06-19 ENCOUNTER — APPOINTMENT (OUTPATIENT)
Dept: SURGERY | Facility: CLINIC | Age: 60
End: 2025-06-19
Payer: COMMERCIAL

## 2025-06-19 VITALS
BODY MASS INDEX: 30.22 KG/M2 | OXYGEN SATURATION: 97 % | SYSTOLIC BLOOD PRESSURE: 124 MMHG | WEIGHT: 165.2 LBS | HEART RATE: 60 BPM | DIASTOLIC BLOOD PRESSURE: 79 MMHG

## 2025-06-19 PROBLEM — K44.9: Status: ACTIVE | Noted: 2025-06-19

## 2025-06-19 PROCEDURE — 99214 OFFICE O/P EST MOD 30 MIN: CPT

## 2025-07-11 ENCOUNTER — RESULT REVIEW (OUTPATIENT)
Age: 60
End: 2025-07-11

## 2025-08-06 ENCOUNTER — OFFICE (OUTPATIENT)
Dept: URBAN - METROPOLITAN AREA CLINIC 29 | Facility: CLINIC | Age: 60
Setting detail: OPHTHALMOLOGY
End: 2025-08-06
Payer: COMMERCIAL

## 2025-08-06 DIAGNOSIS — H01.004: ICD-10-CM

## 2025-08-06 DIAGNOSIS — H52.4: ICD-10-CM

## 2025-08-06 DIAGNOSIS — H01.001: ICD-10-CM

## 2025-08-06 DIAGNOSIS — H35.463: ICD-10-CM

## 2025-08-06 DIAGNOSIS — H40.053: ICD-10-CM

## 2025-08-06 PROCEDURE — 92015 DETERMINE REFRACTIVE STATE: CPT | Performed by: OPHTHALMOLOGY

## 2025-08-06 PROCEDURE — 92083 EXTENDED VISUAL FIELD XM: CPT | Performed by: OPHTHALMOLOGY

## 2025-08-06 PROCEDURE — 92014 COMPRE OPH EXAM EST PT 1/>: CPT | Performed by: OPHTHALMOLOGY

## 2025-08-06 PROCEDURE — 92250 FUNDUS PHOTOGRAPHY W/I&R: CPT | Performed by: OPHTHALMOLOGY

## 2025-08-06 ASSESSMENT — REFRACTION_CURRENTRX
OD_VPRISM_DIRECTION: PROGS
OD_AXIS: 2
OD_OVR_VA: 20/
OD_ADD: +2.25
OS_ADD: +2.25
OS_VPRISM_DIRECTION: PROGS
OS_ADD: +2.75
OS_OVR_VA: 20/
OS_AXIS: 8
OD_VPRISM_DIRECTION: PROGS
OS_SPHERE: -2.50
OS_SPHERE: -2.00
OS_VPRISM_DIRECTION: PROGS
OD_CYLINDER: +3.50
OS_OVR_VA: 20/
OD_AXIS: 175
OS_CYLINDER: +1.75
OD_OVR_VA: 20/
OD_SPHERE: -3.25
OD_ADD: +2.75
OS_CYLINDER: +1.75
OS_OVR_VA: 20/
OD_CYLINDER: +3.50
OD_OVR_VA: 20/
OS_AXIS: 005
OD_SPHERE: -2.75

## 2025-08-06 ASSESSMENT — REFRACTION_MANIFEST
OS_SPHERE: -2.00
OD_SPHERE: -2.50
OD_ADD: +2.50
OS_AXIS: 180
OS_ADD: +2.50
OS_VA1: 20/25
OD_VA1: 20/25
OD_AXIS: 180
OD_CYLINDER: +3.50
OS_CYLINDER: +2.00

## 2025-08-06 ASSESSMENT — CONFRONTATIONAL VISUAL FIELD TEST (CVF)
OD_FINDINGS: FULL
OS_FINDINGS: FULL

## 2025-08-06 ASSESSMENT — REFRACTION_AUTOREFRACTION
OD_AXIS: 005
OS_CYLINDER: +2.00
OS_AXIS: 168
OD_CYLINDER: +3.25
OS_SPHERE: -1.75
OD_SPHERE: -2.25

## 2025-08-06 ASSESSMENT — KERATOMETRY
OS_K1POWER_DIOPTERS: 42.25
OS_AXISANGLE_DEGREES: 180
OD_K2POWER_DIOPTERS: 44.50
OD_K1POWER_DIOPTERS: 42.00
OS_K2POWER_DIOPTERS: 44.00
OD_AXISANGLE_DEGREES: 002

## 2025-08-06 ASSESSMENT — TONOMETRY
OS_IOP_MMHG: 18
OD_IOP_MMHG: 18

## 2025-08-06 ASSESSMENT — VISUAL ACUITY
OD_BCVA: 20/20-2
OS_BCVA: 20/25+2